# Patient Record
Sex: MALE | URBAN - NONMETROPOLITAN AREA
[De-identification: names, ages, dates, MRNs, and addresses within clinical notes are randomized per-mention and may not be internally consistent; named-entity substitution may affect disease eponyms.]

---

## 2024-07-03 ENCOUNTER — TELEPHONE (OUTPATIENT)
Dept: PSYCHIATRY | Facility: CLINIC | Age: 39
End: 2024-07-03

## 2024-07-03 NOTE — TELEPHONE ENCOUNTER
Received referral from [facility]. Called pt to schedule, unable to reach. VM is not set up.

## 2024-09-16 ENCOUNTER — OFFICE VISIT (OUTPATIENT)
Dept: PSYCHIATRY | Facility: CLINIC | Age: 39
End: 2024-09-16
Payer: COMMERCIAL

## 2024-09-16 DIAGNOSIS — F41.1 GAD (GENERALIZED ANXIETY DISORDER): ICD-10-CM

## 2024-09-16 DIAGNOSIS — F33.2 SEVERE EPISODE OF RECURRENT MAJOR DEPRESSIVE DISORDER, WITHOUT PSYCHOTIC FEATURES: Primary | ICD-10-CM

## 2024-09-16 DIAGNOSIS — F15.94: ICD-10-CM

## 2024-09-16 PROBLEM — R45.4 IRRITABILITY AND ANGER: Status: ACTIVE | Noted: 2024-09-16

## 2024-09-16 PROCEDURE — 90792 PSYCH DIAG EVAL W/MED SRVCS: CPT

## 2024-09-16 RX ORDER — DESVENLAFAXINE 100 MG/1
100 TABLET, EXTENDED RELEASE ORAL EVERY EVENING
Qty: 30 TABLET | Refills: 2 | Status: SHIPPED | OUTPATIENT
Start: 2024-09-16

## 2024-09-16 RX ORDER — DIVALPROEX SODIUM 500 MG/1
500 TABLET, EXTENDED RELEASE ORAL EVERY EVENING
COMMUNITY
Start: 2024-09-01

## 2024-09-16 RX ORDER — HYDROXYZINE PAMOATE 50 MG/1
50 CAPSULE ORAL 3 TIMES DAILY PRN
COMMUNITY
Start: 2024-08-30

## 2024-09-16 RX ORDER — QUETIAPINE FUMARATE 50 MG/1
1 TABLET, FILM COATED ORAL DAILY
COMMUNITY
Start: 2024-09-01

## 2024-09-16 RX ORDER — DESVENLAFAXINE 50 MG/1
50 TABLET, FILM COATED, EXTENDED RELEASE ORAL EVERY EVENING
COMMUNITY
Start: 2024-09-01 | End: 2024-09-16 | Stop reason: SDUPTHER

## 2025-01-13 DIAGNOSIS — F33.2 SEVERE EPISODE OF RECURRENT MAJOR DEPRESSIVE DISORDER, WITHOUT PSYCHOTIC FEATURES: ICD-10-CM

## 2025-01-13 NOTE — TELEPHONE ENCOUNTER
Rx Refill Note  Requested Prescriptions     Pending Prescriptions Disp Refills    divalproex (DEPAKOTE ER) 500 MG 24 hr tablet [Pharmacy Med Name: Divalproex Sodium  MG Oral Tablet Extended Release 24 Hour] 30 tablet 0     Sig: TAKE 1 TABLET BY MOUTH ONCE DAILY IN THE EVENING      Last office visit with prescribing clinician: 10/28/2024     Next office visit with prescribing clinician: 1/20/2025     Office Visit with Heavenly Young, GUADALUPE, APRN (10/28/2024)     Med check - 1-    Cathy Perea MA  01/13/25, 10:02 EST

## 2025-01-15 RX ORDER — DIVALPROEX SODIUM 500 MG/1
500 TABLET, FILM COATED, EXTENDED RELEASE ORAL EVERY EVENING
Qty: 30 TABLET | Refills: 2 | Status: SHIPPED | OUTPATIENT
Start: 2025-01-15

## 2025-01-20 ENCOUNTER — OFFICE VISIT (OUTPATIENT)
Dept: PSYCHIATRY | Facility: CLINIC | Age: 40
End: 2025-01-20
Payer: COMMERCIAL

## 2025-01-20 VITALS
HEART RATE: 74 BPM | SYSTOLIC BLOOD PRESSURE: 130 MMHG | WEIGHT: 83.6 LBS | DIASTOLIC BLOOD PRESSURE: 88 MMHG | OXYGEN SATURATION: 100 %

## 2025-01-20 DIAGNOSIS — F41.0 GENERALIZED ANXIETY DISORDER WITH PANIC ATTACKS: ICD-10-CM

## 2025-01-20 DIAGNOSIS — F33.1 MDD (MAJOR DEPRESSIVE DISORDER), RECURRENT EPISODE, MODERATE: ICD-10-CM

## 2025-01-20 DIAGNOSIS — F41.1 GENERALIZED ANXIETY DISORDER WITH PANIC ATTACKS: ICD-10-CM

## 2025-01-20 DIAGNOSIS — Z51.81 ENCOUNTER FOR MEDICATION MONITORING: Primary | ICD-10-CM

## 2025-01-20 PROCEDURE — 96127 BRIEF EMOTIONAL/BEHAV ASSMT: CPT

## 2025-01-20 PROCEDURE — 99214 OFFICE O/P EST MOD 30 MIN: CPT

## 2025-01-20 RX ORDER — PROPRANOLOL HYDROCHLORIDE 10 MG/1
10 TABLET ORAL DAILY PRN
Qty: 30 TABLET | Refills: 2 | Status: SHIPPED | OUTPATIENT
Start: 2025-01-20

## 2025-01-20 NOTE — PROGRESS NOTES
"  Chief complaint: Depression       Subjective      History of present illness:   Depression through out his life since adolescence : several medications in the past, unable to recall most meds    Hx of trauma 16 yo , best friend completed suicide   Started using substances , progressed to methamphetamine use   Inpt chemical dependency treatment : sober since approximately January 2024  Daily marijuana use 4-5 joints per day, occasional alcohol use     Stopped medications for a long time   Vieyra : inpt 2024 : severe depression : restarted on meds (depakote, seroquel, pristiq)   Hx of SI , no previous attempts   Hx of panic attacks : hydroxyzine not effective     Social support with mother, father, sister , ex-spouse   Children, active parent, enjoys spending time with them   Substance use treatment : [facility]  January 2024    Decreased pleasure and interest   Anxiety associated : financial stress   Feels defeated and feels like a failure   Anger, irritability   Low energy , poor motivation     Onset: childhood     Frequency: daily     Duration: several years    Appetite:  Lost 10 pounds, fluctuates     SI/HI/AVH: Denies       Hx of Irritability and anger, started on depakote inpt, and Seroquel for sleep, prisitiq  prior to initial appt, increased to 100 mg now with benefit for depression, anxiety     Today   Mood: \"Pretty good lately\"  Sleep: \"Not enough \" reports poor sleep hygiene, often choosing to go to bed late in the evenings but not unable to fall asleep   Panic attack while out of town, some increased situational stressors at the time, argument with family    Feels unpleasant somatic sensation, increased HR, and BP, intense anxiety  denies feeling hopeless, more optimistic overall    Passive SI less frequent, less intense, no plan no intent , described as abrupt thought when anxiety is increased, but does not persist or linger      Denies current self injurious behavior  Denies current symptoms of psychosis, " shakeel, hypomania  Denies current SI/HI/AVH   Denies any current unwanted or adverse medication side effects     Medical History     PCP: Maurizio  PMH: Hearing damage from work, ringing in ears , HTN, hx methamphetamine abuse , substance induced mood disorder,   Denies history of or current seizures, denies history of head injury   Denies cardiac history, or abnormal ekgs, or irregular heart rate/rhythm    Psychiatric History    Previous Diagnoses: Depression   Previous Psychotropic medications: Wellbutrin, Lexapro, hydroxyzine    Psychotherapy: Denies   Hospitalization hx: Previous   Previous SI/HI/AVH: Denies     Family Psychiatric History: mother: depression      Social History     Socioeconomic History    Marital status:    Tobacco Use    Smoking status: Every Day     Current packs/day: 0.50     Average packs/day: 0.5 packs/day for 16.3 years (8.2 ttl pk-yrs)     Types: Cigarettes     Start date: 9/16/2008    Smokeless tobacco: Never   Vaping Use    Vaping status: Never Used   Substance and Sexual Activity    Alcohol use: Not Currently     Alcohol/week: 0.0 - 1.0 standard drinks of alcohol    Drug use: Yes     Types: Marijuana    Sexual activity: Defer     Relationships: single   Education:   Occupation: Full time   Living Arrangements: with parents   Trauma (emotional, psychological, physical, sexual) : 15 yo friend completed, completed suicide   Legal: Denies   Hobbies: Hunting (previous)     Substance use:   Alcohol: 6 beers per month    Illicit drugs: methamphetamine (not since January 2024)  Cannabis/Marijuana: Smokes marijuana 4-5 joints per day   Caffeine: daily , 1 energy drink   Prescription medications: Denies   Tobacco: Denies   Vaping: Denies   OTC: Denies     Current Outpatient Medications:       Current Outpatient Medications:     desvenlafaxine (PRISTIQ) 100 MG 24 hr tablet, Take 1 tablet by mouth Every Evening., Disp: 30 tablet, Rfl: 2    divalproex (DEPAKOTE ER) 500 MG 24 hr tablet, TAKE 1  TABLET BY MOUTH ONCE DAILY IN THE EVENING, Disp: 30 tablet, Rfl: 2    omeprazole (priLOSEC) 20 MG capsule, Take 1 capsule by mouth Daily., Disp: , Rfl:     propranolol (INDERAL) 10 MG tablet, Take 1 tablet by mouth Daily As Needed (panic attacks)., Disp: 30 tablet, Rfl: 2    QUEtiapine (SEROquel) 50 MG tablet, Take 1 tablet by mouth Daily., Disp: 30 tablet, Rfl: 2       Allergies:  No Known Allergies     PHQ9    PHQ-9 Depression Screening  Little interest or pleasure in doing things? Several days   Feeling down, depressed, or hopeless? Several days   PHQ-2 Total Score 2   Trouble falling or staying asleep, or sleeping too much? Several days   Feeling tired or having little energy? Over half   Poor appetite or overeating? Over half   Feeling bad about yourself - or that you are a failure or have let yourself or your family down? Several days   Trouble concentrating on things, such as reading the newspaper or watching television? Over half   Moving or speaking so slowly that other people could have noticed? Or the opposite - being so fidgety or restless that you have been moving around a lot more than usual? Over half   Thoughts that you would be better off dead, or of hurting yourself in some way? Not at all   PHQ-9 Total Score 12   If you checked off any problems, how difficult have these problems made it for you to do your work, take care of things at home, or get along with other people? Somewhat difficult         TIHAGO-7:   Over the last two weeks, how often have you been bothered by the following problems?  Feeling nervous, anxious or on edge: Nearly every day  Not being able to stop or control worrying: Several days  Worrying too much about different things: Several days  Trouble Relaxing: Nearly every day  Being so restless that it is hard to sit still: Nearly every day  Becoming easily annoyed or irritable: More than half the days  Feeling afraid as if something awful might happen: Several days  THIAGO 7 Total  Score: 14  If you checked any problems, how difficult have these problems made it for you to do your work, take care of things at home, or get along with other people: Somewhat difficult]    Objective:     Vital Signs   There were no vitals filed for this visit.         MENTAL STATUS EXAM   General Appearance:  Cleanly groomed and dressed  Eye Contact:  Fair  Attitude:  Polite and guarded  Motor Activity:  Fidgety and restless  Speech:  Minimal spontaneity  Mood and affect:  Anxious  Hopelessness:  Denies  Thought Process:  Logical, goal-directed and linear  Associations/ Thought Content:  No delusions and other  Other Comment:  Did not voice delusional content for discussion  Hallucinations:  None  Suicidal Ideations:  Not present  Homicidal Ideation:  Not present  Sensorium:  Alert and clear  Orientation:  Person, place, time and situation  Attention Span/ Concentration:  Good  Intellectual Functioning:  Average range  Insight:  Good  Judgement:  Good  Reliability:  Fair  Impulse Control:  Good      Assessment & Plan   Diagnoses and all orders for this visit:    Diagnoses and all orders for this visit:    1. Encounter for medication monitoring (Primary)  -     Valproic Acid Level, Total  -     Comprehensive Metabolic Panel  -     CBC & Differential    2. Generalized anxiety disorder with panic attacks  -     propranolol (INDERAL) 10 MG tablet; Take 1 tablet by mouth Daily As Needed (panic attacks).  Dispense: 30 tablet; Refill: 2    3. MDD (major depressive disorder), recurrent episode, moderate             Treatment Plan:  Continue supportive psychotherapy efforts and medications as indicated. Treatment and medication options discussed during today's visit. Patient ackowledged and verbally consented to continue with current treatment plan and was educated on the importance of compliance with treatment and follow-up appointments.     Medication side effects reviewed and discussed.  Patient agrees to call office  with worsening symptoms or adverse medication side effects.   Continue supportive psychotherapy efforts and medications as indicated. Treatment and medication options discussed during today's visit. Patient ackowledged and verbally consented to continue with current treatment plan and was educated on the importance of compliance with treatment and follow-up appointments.     Reported trouble managing severe panic attacks, approx one per month triggered by situational stressors, unpleasant somatic symptoms, increased BP/HR  , start propranolol prn   When panic occurs it is disrupted, called into work   Encouraged stress management strategies , would likely benefit from CBT to establish better coping skills   Differential : PTSD, consider current SISSY, affective d/o  Obtain CMP, CBC, VPA today, discussed last appt      Start Propranolol 10 mg daily prn panic attacks   Continue Seroquel 50 mg nightly for sleep and anxiety  Continue Depakote  mg nightly for irritability and anger   Continue Pristiq 100 mg daily for depression and anxiety     Follow up 12 weeks     Short Term Goals: Continue to build rapport with pt     Long Term Goals: Pt will see remission of distressing symptoms     Treatment Plan discussed with: Patient, I discussed the patients findings and my recommendations with patient. Pt is agreeable and approves of plan.    Pt agrees with a safety plan for any thoughts of self injurious behavior. Pt will call this office at [phone] or the suicide hotline at [phone].  In an emergency the pt agrees to call 911.    Referring MD has access to consult report and progress notes in EMR     Heavenly Young DNP, APRN   01/20/2025   09:20 EDT

## 2025-01-21 LAB
ALBUMIN SERPL-MCNC: 4.3 G/DL (ref 4.1–5.1)
ALP SERPL-CCNC: 61 IU/L (ref 44–121)
ALT SERPL-CCNC: 13 IU/L (ref 0–44)
AST SERPL-CCNC: 18 IU/L (ref 0–40)
BASOPHILS # BLD AUTO: 0 X10E3/UL (ref 0–0.2)
BASOPHILS NFR BLD AUTO: 1 %
BILIRUB SERPL-MCNC: 0.3 MG/DL (ref 0–1.2)
BUN SERPL-MCNC: 9 MG/DL (ref 6–24)
BUN/CREAT SERPL: 10 (ref 9–20)
CALCIUM SERPL-MCNC: 9.1 MG/DL (ref 8.7–10.2)
CHLORIDE SERPL-SCNC: 99 MMOL/L (ref 96–106)
CO2 SERPL-SCNC: 26 MMOL/L (ref 20–29)
CREAT SERPL-MCNC: 0.9 MG/DL (ref 0.76–1.27)
EGFRCR SERPLBLD CKD-EPI 2021: 111 ML/MIN/1.73
EOSINOPHIL # BLD AUTO: 0.2 X10E3/UL (ref 0–0.4)
EOSINOPHIL NFR BLD AUTO: 4 %
ERYTHROCYTE [DISTWIDTH] IN BLOOD BY AUTOMATED COUNT: 12.7 % (ref 11.6–15.4)
GLOBULIN SER CALC-MCNC: 2.1 G/DL (ref 1.5–4.5)
GLUCOSE SERPL-MCNC: 99 MG/DL (ref 70–99)
HCT VFR BLD AUTO: 42.7 % (ref 37.5–51)
HGB BLD-MCNC: 14 G/DL (ref 13–17.7)
IMM GRANULOCYTES # BLD AUTO: 0 X10E3/UL (ref 0–0.1)
IMM GRANULOCYTES NFR BLD AUTO: 0 %
LYMPHOCYTES # BLD AUTO: 1.5 X10E3/UL (ref 0.7–3.1)
LYMPHOCYTES NFR BLD AUTO: 32 %
MCH RBC QN AUTO: 29.5 PG (ref 26.6–33)
MCHC RBC AUTO-ENTMCNC: 32.8 G/DL (ref 31.5–35.7)
MCV RBC AUTO: 90 FL (ref 79–97)
MONOCYTES # BLD AUTO: 0.7 X10E3/UL (ref 0.1–0.9)
MONOCYTES NFR BLD AUTO: 16 %
NEUTROPHILS # BLD AUTO: 2.3 X10E3/UL (ref 1.4–7)
NEUTROPHILS NFR BLD AUTO: 47 %
PLATELET # BLD AUTO: 204 X10E3/UL (ref 150–450)
POTASSIUM SERPL-SCNC: 4 MMOL/L (ref 3.5–5.2)
PROT SERPL-MCNC: 6.4 G/DL (ref 6–8.5)
RBC # BLD AUTO: 4.74 X10E6/UL (ref 4.14–5.8)
SODIUM SERPL-SCNC: 139 MMOL/L (ref 134–144)
VALPROATE SERPL-MCNC: 13 UG/ML (ref 50–100)
WBC # BLD AUTO: 4.7 X10E3/UL (ref 3.4–10.8)

## 2025-01-23 ENCOUNTER — TELEPHONE (OUTPATIENT)
Dept: PSYCHIATRY | Facility: CLINIC | Age: 40
End: 2025-01-23
Payer: COMMERCIAL

## 2025-01-23 NOTE — TELEPHONE ENCOUNTER
----- Message from Heavenly Young sent at 1/23/2025 12:49 PM EST -----  Depakote level was lower than therapeutic range, if no distressing symptoms, continue current dose of depakote  ----- Message -----  From: [facility] Results In  Sent: 1/21/2025   7:08 AM EST  To: Heavenly Young DNP, APRN

## 2025-04-19 DIAGNOSIS — F41.1 GENERALIZED ANXIETY DISORDER WITH PANIC ATTACKS: ICD-10-CM

## 2025-04-19 DIAGNOSIS — F41.0 GENERALIZED ANXIETY DISORDER WITH PANIC ATTACKS: ICD-10-CM

## 2025-04-21 RX ORDER — PROPRANOLOL HYDROCHLORIDE 10 MG/1
10 TABLET ORAL DAILY PRN
Qty: 30 TABLET | Refills: 0 | Status: SHIPPED | OUTPATIENT
Start: 2025-04-21